# Patient Record
Sex: MALE | Race: WHITE | Employment: FULL TIME | URBAN - METROPOLITAN AREA
[De-identification: names, ages, dates, MRNs, and addresses within clinical notes are randomized per-mention and may not be internally consistent; named-entity substitution may affect disease eponyms.]

---

## 2019-11-10 ENCOUNTER — APPOINTMENT (OUTPATIENT)
Dept: GENERAL RADIOLOGY | Age: 59
End: 2019-11-10
Payer: COMMERCIAL

## 2019-11-10 ENCOUNTER — HOSPITAL ENCOUNTER (EMERGENCY)
Age: 59
Discharge: HOME | End: 2019-11-10
Payer: COMMERCIAL

## 2019-11-10 VITALS
HEART RATE: 92 BPM | OXYGEN SATURATION: 100 % | DIASTOLIC BLOOD PRESSURE: 76 MMHG | RESPIRATION RATE: 16 BRPM | WEIGHT: 210 LBS | BODY MASS INDEX: 30.06 KG/M2 | TEMPERATURE: 99.5 F | HEIGHT: 70 IN | SYSTOLIC BLOOD PRESSURE: 150 MMHG

## 2019-11-10 DIAGNOSIS — S40.012A CONTUSION OF MULTIPLE SITES OF LEFT SHOULDER AND UPPER ARM, INITIAL ENCOUNTER: ICD-10-CM
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
## 2019-11-10 DIAGNOSIS — S40.022A CONTUSION OF MULTIPLE SITES OF LEFT SHOULDER AND UPPER ARM, INITIAL ENCOUNTER: ICD-10-CM

## 2019-11-10 PROCEDURE — 73030 X-RAY EXAM OF SHOULDER: CPT

## 2019-11-10 PROCEDURE — 99284 EMERGENCY DEPT VISIT MOD MDM: CPT

## 2019-11-10 RX ORDER — METHOCARBAMOL 750 MG/1
750 TABLET, FILM COATED ORAL 4 TIMES DAILY
Qty: 40 TABLET | Refills: 0 | Status: SHIPPED | OUTPATIENT
Start: 2019-11-10 | End: 2019-11-20

## 2019-11-10 RX ORDER — NAPROXEN 500 MG/1
500 TABLET ORAL 2 TIMES DAILY
Qty: 60 TABLET | Refills: 0 | Status: SHIPPED | OUTPATIENT
Start: 2019-11-10

## 2019-11-10 ASSESSMENT — PAIN DESCRIPTION - DESCRIPTORS: DESCRIPTORS: CONSTANT;DISCOMFORT;SHARP

## 2019-11-10 ASSESSMENT — PAIN DESCRIPTION - FREQUENCY: FREQUENCY: CONTINUOUS

## 2019-11-10 ASSESSMENT — PAIN DESCRIPTION - ONSET: ONSET: SUDDEN

## 2019-11-10 ASSESSMENT — PAIN DESCRIPTION - LOCATION: LOCATION: SHOULDER

## 2019-11-10 ASSESSMENT — PAIN DESCRIPTION - ORIENTATION: ORIENTATION: LEFT

## 2019-11-10 ASSESSMENT — PAIN DESCRIPTION - PROGRESSION: CLINICAL_PROGRESSION: GRADUALLY WORSENING

## 2019-11-10 ASSESSMENT — PAIN SCALES - GENERAL: PAINLEVEL_OUTOF10: 8

## 2019-11-10 ASSESSMENT — PAIN DESCRIPTION - PAIN TYPE: TYPE: ACUTE PAIN

## 2020-09-17 ENCOUNTER — HOSPITAL ENCOUNTER (OUTPATIENT)
Age: 60
Discharge: HOME | End: 2020-09-19
Payer: COMMERCIAL

## 2020-09-17 LAB — PROSTATE SPECIFIC ANTIGEN: 5.03 NG/ML (ref 0–4)

## 2020-09-17 PROCEDURE — G0103 PSA SCREENING: HCPCS

## 2020-09-21 ENCOUNTER — HOSPITAL ENCOUNTER (OUTPATIENT)
Age: 60
Discharge: HOME | End: 2020-09-23
Payer: COMMERCIAL

## 2020-09-21 PROCEDURE — 88305 TISSUE EXAM BY PATHOLOGIST: CPT

## 2023-07-14 ENCOUNTER — OFFICE VISIT (OUTPATIENT)
Dept: SURGERY | Age: 63
End: 2023-07-14

## 2023-07-14 VITALS
DIASTOLIC BLOOD PRESSURE: 94 MMHG | OXYGEN SATURATION: 98 % | WEIGHT: 209 LBS | HEIGHT: 70 IN | HEART RATE: 84 BPM | TEMPERATURE: 97.4 F | BODY MASS INDEX: 29.92 KG/M2 | SYSTOLIC BLOOD PRESSURE: 148 MMHG

## 2023-07-14 DIAGNOSIS — C44.92 SCC (SQUAMOUS CELL CARCINOMA): ICD-10-CM

## 2023-07-14 DIAGNOSIS — C43.61 MALIGNANT MELANOMA OF RIGHT UPPER EXTREMITY INCLUDING SHOULDER (HCC): ICD-10-CM

## 2023-07-14 DIAGNOSIS — C44.612 BASAL CELL CARCINOMA (BCC) OF SKIN OF RIGHT UPPER EXTREMITY INCLUDING SHOULDER: Primary | ICD-10-CM

## 2023-07-14 PROCEDURE — 99204 OFFICE O/P NEW MOD 45 MIN: CPT | Performed by: PLASTIC SURGERY

## 2023-07-17 ENCOUNTER — TELEPHONE (OUTPATIENT)
Dept: SURGERY | Age: 63
End: 2023-07-17

## 2023-07-17 NOTE — TELEPHONE ENCOUNTER
Patient scheduled with pcp 7/20/2023 am  for medical clearance    Patient will not have insurance until 8/1/2023    Procedure: excision of melanoma left upper arm with 1 cm margin,right forearm basal cell carcinoma,left forearm squamous cell carcinoma with possible full thickness skin graft     Informed wife we can schedule possible 8/1/2023 or 8/3/2023 At [facility]

## 2023-07-21 ENCOUNTER — TELEPHONE (OUTPATIENT)
Dept: SURGERY | Age: 63
End: 2023-07-21

## 2023-07-21 NOTE — TELEPHONE ENCOUNTER
Carlos Thomas wife of patient calling asking if med clearance received, would like surgery date please call [phone]

## 2023-07-24 ENCOUNTER — PREP FOR PROCEDURE (OUTPATIENT)
Dept: SURGERY | Age: 63
End: 2023-07-24

## 2023-07-24 PROBLEM — C44.629 SQUAMOUS CELL CANCER OF SKIN OF LEFT FOREARM: Status: ACTIVE | Noted: 2023-07-24

## 2023-07-24 PROBLEM — C43.62 MALIGNANT MELANOMA OF LEFT UPPER ARM (HCC): Status: ACTIVE | Noted: 2023-07-24

## 2023-07-24 PROBLEM — C44.612 BASAL CELL CARCINOMA OF RIGHT FOREARM: Status: ACTIVE | Noted: 2023-07-24

## 2023-07-24 NOTE — TELEPHONE ENCOUNTER
Awaiting medical clearance from pcp  Patient was seen by pcp    Outpatient procedure: excision of melanoma left upper arm with 1 cm margin,right forearm basal cell carcinoma  left forearm squamous cell carcinoma with possible full thickness skin graft   Surgery has been scheduled at [place], [place] on 8/1/2023, Pre-Admission Testing will call you prior to surgery to inform you arrival time and any other additional directions,if they are unable to reach you,please call them two days prior at [phone]. If taking Fish Oil, Vitamins, two weeks prior to surgery stop taking. If taking NSAIDS (such as Aspirin, Ibuprofen) anticoagulants please consult with your prescribing physician to get further instructions on when to stop medication prior to surgery that is scheduled, patient understood. Pre-Auth #:no prior auth. , required per Rickie rep. ref# [id number]  CPT Codes: 65509,60856,43976,76727,74985,10126  ICD 10:c44.612,c44.92,c43.61

## 2023-07-24 NOTE — TELEPHONE ENCOUNTER
Patient called office provided [employer] id, added to his registration. ID # [id number]. Patient asking if medical clearance has been faxed. Did not see note in epic.

## 2023-07-26 NOTE — H&P
Department of Plastic Surgery - Adult  Attending Consult Note        CHIEF COMPLAINT:   Melanoma, basal cell carcinoma, squamous cell carcinoma     History Obtained From:  patient     HISTORY OF PRESENT ILLNESS:                 The patient is a 60 y.o. male who presents with biopsy proven melanoma of the left upper arm as well as basal cell carcinoma to the right forearm and squamous cell carcinoma to the left forearm. The patient states that they first noticed the melanotic left upper arm lesion several months ago. It has  grown in size since they first noticed the lesion. The lesion has not changed in color and has not had discharge or bleeding. The pt has had the lesion biopsied previously. The patient has not had the lesion removed previously. The patient states the lesion is not painfull. The patient denies any recent unexpected weight loss. Patient denies any palpable masses to their axilla or cervical masses . The pt denies any other associated symptoms. Past Medical History:    Past Medical History        Past Medical History:   Diagnosis Date    Melanoma ([address]) 07/2023     left upper arm         Past Surgical History:    Past Surgical History         Past Surgical History:   Procedure Laterality Date    CYST REMOVAL Right       arm    KNEE SURGERY             Current Medications:   Current Hospital Medications   No current facility-administered medications for this visit. Allergies:  Patient has no known allergies.      Social History:   Social History               Socioeconomic History    Marital status:        Spouse name: Not on file    Number of children: Not on file    Years of education: Not on file    Highest education level: Not on file   Occupational History    Not on file   Tobacco Use    Smoking status: Never    Smokeless tobacco: Current       Types: Chew   Substance and Sexual Activity    Alcohol use: Yes       Comment: holidays    Drug use: Never    Sexual activity: Not

## 2023-07-26 NOTE — PROGRESS NOTES
[address]   PRE-ADMISSION TESTING GENERAL INSTRUCTIONS  [facility] Phone Number: [phone]      GENERAL INSTRUCTIONS:    [x] Antibacterial Soap Shower Night before and/or AM of surgery. [x] Do not wear makeup, lotions, powders, deodorant. [x] Nothing by mouth after midnight; including gum, candy, mints, or water. [x] You may brush your teeth, gargle, but do NOT swallow water. [x] No tobacco products, illegal drugs, or alcohol within 24 hours of your surgery. [x] Jewelry or valuables should not be brought to the hospital. All body and/or tongue piercing's must be removed prior to arriving to hospital. No contact lens or hair pins. [x] Arrange transportation with a responsible adult  to and from the hospital. Arrange for someone to be with you for the remainder of the day and for 24 hours after your procedure due to having had anesthesia. -Who will be your  for transportation? Wife         -Who will be staying with you for 24 hrs after your procedure? Wife  [x] Bring insurance card and photo ID. [x] Bring copy of living will or healthcare power of  papers to be placed in your electronic record. PARKING INSTRUCTIONS:     [x] ARRIVAL DATE & TIME: 8/1/23     0900  [x] Times are subject to change. We will contact you the business day before surgery if that were to occur. [x] Enter into the [employer]. Two people may accompany you. Masks are not required. [x] Parking Lot \"I\" is where you will park. It is located on the corner of [address] [place] [address] and [address] [place] [address]. The entrance is on [address] [facility]. Only one vehicle - per patient, is permitted in parking lot. Upon entering the parking lot, a voucher ticket will print. MEDICATION INSTRUCTIONS:    [x] Bring a complete list of your medications, please write the last time you took the medicine, give this list to the nurse in Pre-Op.   [x] Take only the following medications the

## 2023-08-01 ENCOUNTER — HOSPITAL ENCOUNTER (OUTPATIENT)
Age: 63
Setting detail: OUTPATIENT SURGERY
Discharge: HOME | End: 2023-08-01
Attending: PLASTIC SURGERY | Admitting: PLASTIC SURGERY
Payer: COMMERCIAL

## 2023-08-01 ENCOUNTER — ANESTHESIA (OUTPATIENT)
Dept: OPERATING ROOM | Age: 63
End: 2023-08-01
Payer: COMMERCIAL

## 2023-08-01 ENCOUNTER — ANESTHESIA EVENT (OUTPATIENT)
Dept: OPERATING ROOM | Age: 63
End: 2023-08-01
Payer: COMMERCIAL

## 2023-08-01 VITALS
TEMPERATURE: 97.7 F | HEART RATE: 80 BPM | OXYGEN SATURATION: 99 % | RESPIRATION RATE: 18 BRPM | WEIGHT: 209 LBS | HEIGHT: 70 IN | DIASTOLIC BLOOD PRESSURE: 74 MMHG | BODY MASS INDEX: 29.92 KG/M2 | SYSTOLIC BLOOD PRESSURE: 134 MMHG

## 2023-08-01 DIAGNOSIS — G89.18 POST-OP PAIN: Primary | ICD-10-CM

## 2023-08-01 DIAGNOSIS — C44.629 SQUAMOUS CELL CANCER OF SKIN OF LEFT FOREARM: ICD-10-CM

## 2023-08-01 DIAGNOSIS — C44.612 BASAL CELL CARCINOMA OF RIGHT FOREARM: ICD-10-CM

## 2023-08-01 DIAGNOSIS — C43.62 MALIGNANT MELANOMA OF LEFT UPPER ARM (HCC): ICD-10-CM

## 2023-08-01 PROCEDURE — 7100000010 HC PHASE II RECOVERY - FIRST 15 MIN: Performed by: PLASTIC SURGERY

## 2023-08-01 PROCEDURE — 6370000000 HC RX 637 (ALT 250 FOR IP): Performed by: PLASTIC SURGERY

## 2023-08-01 PROCEDURE — 3600000012 HC SURGERY LEVEL 2 ADDTL 15MIN: Performed by: PLASTIC SURGERY

## 2023-08-01 PROCEDURE — 2500000003 HC RX 250 WO HCPCS

## 2023-08-01 PROCEDURE — 3600000002 HC SURGERY LEVEL 2 BASE: Performed by: PLASTIC SURGERY

## 2023-08-01 PROCEDURE — 2580000003 HC RX 258

## 2023-08-01 PROCEDURE — 88342 IMHCHEM/IMCYTCHM 1ST ANTB: CPT

## 2023-08-01 PROCEDURE — 6360000002 HC RX W HCPCS: Performed by: PHYSICIAN ASSISTANT

## 2023-08-01 PROCEDURE — NBSRV NON-BILLABLE SERVICE: Performed by: PHYSICIAN ASSISTANT

## 2023-08-01 PROCEDURE — 2580000003 HC RX 258: Performed by: PHYSICIAN ASSISTANT

## 2023-08-01 PROCEDURE — 2709999900 HC NON-CHARGEABLE SUPPLY: Performed by: PLASTIC SURGERY

## 2023-08-01 PROCEDURE — 13121 CMPLX RPR S/A/L 2.6-7.5 CM: CPT | Performed by: PLASTIC SURGERY

## 2023-08-01 PROCEDURE — 7100000011 HC PHASE II RECOVERY - ADDTL 15 MIN: Performed by: PLASTIC SURGERY

## 2023-08-01 PROCEDURE — 2500000003 HC RX 250 WO HCPCS: Performed by: PLASTIC SURGERY

## 2023-08-01 PROCEDURE — 88341 IMHCHEM/IMCYTCHM EA ADD ANTB: CPT

## 2023-08-01 PROCEDURE — 6360000002 HC RX W HCPCS

## 2023-08-01 PROCEDURE — 11603 EXC TR-EXT MAL+MARG 2.1-3 CM: CPT | Performed by: PLASTIC SURGERY

## 2023-08-01 PROCEDURE — 11602 EXC TR-EXT MAL+MARG 1.1-2 CM: CPT | Performed by: PLASTIC SURGERY

## 2023-08-01 PROCEDURE — 3700000001 HC ADD 15 MINUTES (ANESTHESIA): Performed by: PLASTIC SURGERY

## 2023-08-01 PROCEDURE — 3700000000 HC ANESTHESIA ATTENDED CARE: Performed by: PLASTIC SURGERY

## 2023-08-01 RX ORDER — LIDOCAINE HYDROCHLORIDE AND EPINEPHRINE 10; 10 MG/ML; UG/ML
INJECTION, SOLUTION INFILTRATION; PERINEURAL PRN
Status: DISCONTINUED | OUTPATIENT
Start: 2023-08-01 | End: 2023-08-01 | Stop reason: ALTCHOICE

## 2023-08-01 RX ORDER — PROPOFOL 10 MG/ML
INJECTION, EMULSION INTRAVENOUS CONTINUOUS PRN
Status: DISCONTINUED | OUTPATIENT
Start: 2023-08-01 | End: 2023-08-01 | Stop reason: SDUPTHER

## 2023-08-01 RX ORDER — CEPHALEXIN 500 MG/1
500 CAPSULE ORAL 4 TIMES DAILY
Qty: 20 CAPSULE | Refills: 0 | Status: SHIPPED | OUTPATIENT
Start: 2023-08-01 | End: 2023-08-06

## 2023-08-01 RX ORDER — FENTANYL CITRATE 50 UG/ML
INJECTION, SOLUTION INTRAMUSCULAR; INTRAVENOUS PRN
Status: DISCONTINUED | OUTPATIENT
Start: 2023-08-01 | End: 2023-08-01 | Stop reason: SDUPTHER

## 2023-08-01 RX ORDER — SODIUM CHLORIDE 0.9 % (FLUSH) 0.9 %
5-40 SYRINGE (ML) INJECTION EVERY 12 HOURS SCHEDULED
Status: DISCONTINUED | OUTPATIENT
Start: 2023-08-01 | End: 2023-08-01 | Stop reason: HOSPADM

## 2023-08-01 RX ORDER — HYDROCODONE BITARTRATE AND ACETAMINOPHEN 5; 325 MG/1; MG/1
1 TABLET ORAL EVERY 6 HOURS PRN
Qty: 15 TABLET | Refills: 0 | Status: SHIPPED | OUTPATIENT
Start: 2023-08-01 | End: 2023-08-08

## 2023-08-01 RX ORDER — GLYCOPYRROLATE 0.2 MG/ML
INJECTION INTRAMUSCULAR; INTRAVENOUS PRN
Status: DISCONTINUED | OUTPATIENT
Start: 2023-08-01 | End: 2023-08-01 | Stop reason: SDUPTHER

## 2023-08-01 RX ORDER — BACITRACIN ZINC 500 [USP'U]/G
OINTMENT TOPICAL PRN
Status: DISCONTINUED | OUTPATIENT
Start: 2023-08-01 | End: 2023-08-01 | Stop reason: ALTCHOICE

## 2023-08-01 RX ORDER — SODIUM CHLORIDE 9 MG/ML
INJECTION, SOLUTION INTRAVENOUS CONTINUOUS
Status: DISCONTINUED | OUTPATIENT
Start: 2023-08-01 | End: 2023-08-01 | Stop reason: HOSPADM

## 2023-08-01 RX ORDER — MIDAZOLAM HYDROCHLORIDE 1 MG/ML
INJECTION INTRAMUSCULAR; INTRAVENOUS PRN
Status: DISCONTINUED | OUTPATIENT
Start: 2023-08-01 | End: 2023-08-01 | Stop reason: SDUPTHER

## 2023-08-01 RX ORDER — SODIUM CHLORIDE 9 MG/ML
INJECTION, SOLUTION INTRAVENOUS PRN
Status: DISCONTINUED | OUTPATIENT
Start: 2023-08-01 | End: 2023-08-01 | Stop reason: HOSPADM

## 2023-08-01 RX ORDER — SODIUM CHLORIDE 9 MG/ML
INJECTION, SOLUTION INTRAVENOUS CONTINUOUS PRN
Status: DISCONTINUED | OUTPATIENT
Start: 2023-08-01 | End: 2023-08-01 | Stop reason: SDUPTHER

## 2023-08-01 RX ORDER — SODIUM CHLORIDE 0.9 % (FLUSH) 0.9 %
5-40 SYRINGE (ML) INJECTION PRN
Status: DISCONTINUED | OUTPATIENT
Start: 2023-08-01 | End: 2023-08-01 | Stop reason: HOSPADM

## 2023-08-01 RX ADMIN — CEFAZOLIN 2000 MG: 2 INJECTION, POWDER, FOR SOLUTION INTRAMUSCULAR; INTRAVENOUS at 10:31

## 2023-08-01 RX ADMIN — PROPOFOL 50 MG: 10 INJECTION, EMULSION INTRAVENOUS at 10:28

## 2023-08-01 RX ADMIN — PROPOFOL 100 MCG/KG/MIN: 10 INJECTION, EMULSION INTRAVENOUS at 10:29

## 2023-08-01 RX ADMIN — FENTANYL CITRATE 50 MCG: 50 INJECTION, SOLUTION INTRAMUSCULAR; INTRAVENOUS at 10:32

## 2023-08-01 RX ADMIN — GLYCOPYRROLATE 0.1 MG: 0.2 INJECTION INTRAMUSCULAR; INTRAVENOUS at 10:41

## 2023-08-01 RX ADMIN — SODIUM CHLORIDE: 9 INJECTION, SOLUTION INTRAVENOUS at 10:21

## 2023-08-01 RX ADMIN — SODIUM CHLORIDE: 9 INJECTION, SOLUTION INTRAVENOUS at 09:23

## 2023-08-01 RX ADMIN — MIDAZOLAM 2 MG: 1 INJECTION INTRAMUSCULAR; INTRAVENOUS at 10:21

## 2023-08-01 ASSESSMENT — PAIN SCALES - GENERAL: PAINLEVEL_OUTOF10: 0

## 2023-08-01 ASSESSMENT — PAIN - FUNCTIONAL ASSESSMENT: PAIN_FUNCTIONAL_ASSESSMENT: 0-10

## 2023-08-01 NOTE — DISCHARGE INSTRUCTIONS
Discharge Home. Call office to schedule a follow-up appointment at [phone]  Please call to schedule an appointment to be seen In our office on Friday 8- 11-23  Diet: regular diet  Activity: ambulate in house and no Strenuous exercise for 1 week  Shower/Bathing: You can shower in 48 hours over the incision site. At that time you can allow soap and water to rinse off the incision site while showering. Once you are done in the shower you can pat dry the incision site with a clean paper towel. No baths, hot tubs or soaking of the wound site at this time. Dressings /Splint /Wound Care:Keep wound clean and dry. Apply bacitracin  to the wound sites two times daily. Driving: It is OK to drive if the following criteria are met:   1- Not taking narcotic pain medication   2- Not distracted by pain or limited ROM   3- Not a hazard to self or others on the road  Medications: As prescribed. Educated to contact physician at [phone] with concerns or signs of infection (redness, increasing pain, discharge, odor, fever).

## 2023-08-01 NOTE — OP NOTE
Operative Note      Patient: Fareed Arteaga  YOB: 1960  MRN: [id number]    Date of Procedure: 8/1/2023    Pre-Op Diagnosis Codes:     * Malignant melanoma of left upper arm ([address]) [C43.62]     * Basal cell carcinoma of right forearm [C44.612]     * Squamous cell cancer of skin of left forearm [C44.629]    Post-Op Diagnosis: Same       Procedure(s):  Excision of melanoma left upper arm with 1cm margin,right forearm basal cell carcinoma,left forearm squamous cell carcinoma with Complex Closures    Surgeon(s):  Fahad Borges MD    Assistant:   Physician Assistant: RUI Morales    Anesthesia: Monitor Anesthesia Care    Estimated Blood Loss (mL): 5cc    Complications: None    Specimens:   ID Type Source Tests Collected by Time Destination   A : Malignant melanoma of left upper arm  Tissue Tissue SURGICAL PATHOLOGY Fahad Borges MD 8/1/2023 1044    B : Basal cell carcinoma of right forearm  Tissue Tissue SURGICAL PATHOLOGY Fahad Borges MD 8/1/2023 1045    C : Squamous cell carcinoma of left forearm Tissue Tissue SURGICAL PATHOLOGY Fahad Borges MD 8/1/2023 1045        Implants:  * No implants in log *      Drains: * No LDAs found *        Wide Local Excision for Primary Cutaneous Melanoma - Excision 1 (Upper Arm - Left)  Operation performed with curative intent Yes   Original Breslow thickness of the lesion  0.7mm Breslow   Clinical margin width  (measured from the edge of the lesion or the prior excision scar) 1 cm   Depth of excision Full-thickness skin/subcutaneous tissue down to fascia (melanoma)         Detailed Description of Procedure:        ASSISTANT: Brigitte Wilhelm PA-C. Physician assistant was required for the case due to lack adequately trained assistant was involved in prepping draping retracting dressing .     MEASUREMENTS:  Left upper arm malignant melanoma  Lesion: 10 x 10 mm  Margin: 10 mm  Defect: 30 x 30 mm  Scar: 80 mm    Left forearm squamous cell carcinoma  Lesion: 5 x 5 mm  Margin: 4 mm  Defect: 13 x 13 mm  Scar: 45 mm    Right arm basal cell cancer  Lesion: 16 x 16 mm  Margin: 2 mm  Defect: 20 x 20 mm  Scar: 60 mm      PREOPERATIVE INDICATIONS:      The patient is an 60 y.o. male with history of biopsy-proven left upper arm malignant melanoma at Breslow depth of 0.7 mm, left forearm squamous cell carcinoma and right arm basal cell carcinoma. The patient elected to have this excised in the operating room secondary to size, location, frozen sections, and need for potential advanced reconstruction technique. Risks, benefits, and alternatives were explained to the patient including but not limited to bleeding, scarring, infection, recurrent lesion, anesthesia related complications, and need for additional surgery. They understood and elected to proceed. OPERATIVE PROCEDURE:      The patient was seen the day of surgery, marked, and all questions were answered. The patient was taken back to the operating room, placed in supine position. SCDs were on and functioning at the time of induction of anesthesia. Preoperative antibiotics were given prior to incision. The area was prepped and draped in the usual sterile fashion with chlorhexidine prep stick. The area was marked and measured and 2 mm for basal cell, 4 mm for squamous cell, and 10 mm for-melanoma margins were taken circumferentially around the lesion. The area was then anesthetized with 1% lidocaine with 100,000 epinephrine and allowed to work. A 15 blade was used to incise full thickness through the skin and the lesion was sharply lifted off the underlying subcutaneous tissue. The melanoma specimen was marked and sent for permanent pathology. The other specimens were sent for permanent pathology as well. Hemostasis was achieved with monopolar cautery. The area was assessed for the best manner of reconstruction. All areas could be closed by complex means.   With the aid of double hooks

## 2023-08-01 NOTE — ANESTHESIA POSTPROCEDURE EVALUATION
Department of Anesthesiology  Postprocedure Note    Patient: Giana New  MRN: [id number]  YOB: 1960  Date of evaluation: 8/1/2023      Procedure Summary     Date: 08/01/23 Room / Location: [facility] OR 10 / [facility]    Anesthesia Start: 1021 Anesthesia Stop: 1112    Procedure: Excision of melanoma left upper arm with 1cm margin,right forearm basal cell carcinoma,left forearm squamous cell carcinoma (Bilateral: Arm Lower) Diagnosis:       Malignant melanoma of left upper arm (HCC)      Basal cell carcinoma of right forearm      Squamous cell cancer of skin of left forearm      (Malignant melanoma of left upper arm (HCC) [C43.62])      (Basal cell carcinoma of right forearm [C44.612])      (Squamous cell cancer of skin of left forearm [C44.629])    Surgeons: Yash Barbour MD Responsible Provider: Huseyin Villalta MD    Anesthesia Type: MAC ASA Status: 3          Anesthesia Type: No value filed.     Antionette Phase I: Antionette Score: 10    Antionette Phase II: Antionette Score: 10      Anesthesia Post Evaluation    Patient location during evaluation: PACU  Patient participation: complete - patient participated  Level of consciousness: awake  Pain score: 3  Airway patency: patent  Nausea & Vomiting: no nausea and no vomiting  Complications: no  Cardiovascular status: blood pressure returned to baseline  Respiratory status: acceptable  Hydration status: euvolemic

## 2023-08-01 NOTE — ANESTHESIA PRE PROCEDURE
for: PREGTESTUR, PREGSERUM, HCG, HCGQUANT     ABGs: No results found for: PHART, PO2ART, STM7WQC, ZIS0ULU, BEART, J2SPREBX     Type & Screen (If Applicable):  No results found for: LABABO, LABRH    Drug/Infectious Status (If Applicable):  No results found for: HIV, HEPCAB    COVID-19 Screening (If Applicable): No results found for: COVID19        Anesthesia Evaluation  Patient summary reviewed and Nursing notes reviewed no history of anesthetic complications:   Airway: Mallampati: II          Dental:    (+) poor dentition      Pulmonary:Negative Pulmonary ROS breath sounds clear to auscultation                             Cardiovascular:Negative CV ROS  Exercise tolerance: good (>4 METS),           Rhythm: regular  Rate: normal                    Neuro/Psych:   Negative Neuro/Psych ROS              GI/Hepatic/Renal: Neg GI/Hepatic/Renal ROS            Endo/Other: Negative Endo/Other ROS   (+) malignancy/cancer (skin). Abdominal:             Vascular: negative vascular ROS. Other Findings:           Anesthesia Plan      MAC     ASA 3       Induction: intravenous. Anesthetic plan and risks discussed with patient. Plan discussed with surgical team, attending and fellow.                     Nicolas Gerardo, APRN - CRNA   8/1/2023

## 2023-08-07 LAB — SURGICAL PATHOLOGY REPORT: NORMAL

## 2023-08-10 NOTE — PROGRESS NOTES
Subjective: Follow up today from  Excision of melanoma left upper arm with 1cm margin,right forearm basal cell carcinoma,left forearm squamous cell carcinoma with Complex Closures. Denies fever, nausea, vomiting, leg pain or swelling, pain is absent. The pt states that they have  kept the wound site(s) covered as directed. They state that their pain is absent. Objective: There were no vitals taken for this visit. Wounds x 3 : Clean, dry, and intact, no drainage. No signs of infection, wound healing well. Sutures intact  Neuro- Sensation  intact to belgica incisional site with light touch . Assessment:    Patient Active Problem List   Diagnosis    Malignant melanoma of left upper arm ([address])    Basal cell carcinoma of right forearm    Squamous cell cancer of skin of left forearm    Post-op pain       Labs: CBC:   Lab Results   Component Value Date/Time    WBC 6.1 10/11/2016 09:00 AM    RBC 5.44 10/11/2016 09:00 AM    HGB 15.5 10/11/2016 09:00 AM    HCT 48.4 10/11/2016 09:00 AM    MCV 88.9 10/11/2016 09:00 AM    MCH 28.5 10/11/2016 09:00 AM    MCHC 32.1 10/11/2016 09:00 AM    RDW 14.2 10/11/2016 09:00 AM     10/11/2016 09:00 AM    MPV 9.7 10/11/2016 09:00 AM     BMP:    Lab Results   Component Value Date/Time     10/11/2016 09:00 AM    K 4.9 10/11/2016 09:00 AM     10/11/2016 09:00 AM    CO2 28 10/11/2016 09:00 AM    BUN 18 10/11/2016 09:00 AM    LABALBU 4.2 10/11/2016 09:00 AM    CREATININE 1.0 10/11/2016 09:00 AM    CALCIUM 9.2 10/11/2016 09:00 AM    GFRAA >60 10/11/2016 09:00 AM    LABGLOM >60 10/11/2016 09:00 AM    GLUCOSE 98 10/11/2016 09:00 AM         Pathology Report- Path Number: [id number]     -- Diagnosis --   A.   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

## 2023-08-11 ENCOUNTER — OFFICE VISIT (OUTPATIENT)
Dept: SURGERY | Age: 63
End: 2023-08-11

## 2023-08-11 VITALS — TEMPERATURE: 99.3 F

## 2023-08-11 DIAGNOSIS — C44.92 SCC (SQUAMOUS CELL CARCINOMA): Primary | ICD-10-CM

## 2023-08-11 DIAGNOSIS — C43.61 MALIGNANT MELANOMA OF RIGHT UPPER EXTREMITY INCLUDING SHOULDER (HCC): ICD-10-CM

## 2023-08-28 ENCOUNTER — OFFICE VISIT (OUTPATIENT)
Dept: SURGERY | Age: 63
End: 2023-08-28

## 2023-08-28 VITALS — OXYGEN SATURATION: 99 % | TEMPERATURE: 98.3 F | HEART RATE: 82 BPM

## 2023-08-28 DIAGNOSIS — C43.61 MALIGNANT MELANOMA OF RIGHT UPPER EXTREMITY INCLUDING SHOULDER (HCC): Primary | ICD-10-CM

## 2023-08-28 PROCEDURE — 99024 POSTOP FOLLOW-UP VISIT: CPT | Performed by: PHYSICIAN ASSISTANT

## 2023-08-28 NOTE — PROGRESS NOTES
he can begin scar massage to the area in another 2 weeks. Pt was instructed on scar massage  Scar Care Instructions      Sunscreen Recommendations for Scars  We recommend that all patients use a sunscreen when outside but especially on new scars (that are exposed to sunlight) for a year after their procedure. The SPF should be at least 28. Follow the application directions on the bottle. Why is it so Important to Use Sunscreen on Scars? A scar is new and more fragile than the surrounding skin. If you do not use sunscreen, the scar line will react differently to the sun than the surrounding skin. If you don't use sunscreen, the scar tissue will become darker than surrounding skin. This is a hyper-pigmented scar and will remain darker than the other skin. After one year, the scar and surrounding skin should react equally to sun. Superficial Scar Massage  Scar massage desensitizes and reduces scar adhesions so skin glides freely. Rub in a circular motion on and around the scar with firm, even pressure for 5 minutes four times per day   You can start scar massage once incision is completely healed and strong enough to handle the motion (usually 10 - 14 days post operatively). You use lotion to do the scar massage to allow ease with motion over the scar and prevent friction at the area. Patient had no further questions. F/U PRN  Call office with concerns or signs of infection.     Priscilla Eddy [place]   9:43 AM  8/28/2023

## 2023-09-11 ENCOUNTER — OFFICE VISIT (OUTPATIENT)
Dept: SURGERY | Age: 63
End: 2023-09-11

## 2023-09-11 VITALS — TEMPERATURE: 98.3 F

## 2023-09-11 DIAGNOSIS — C44.612 BASAL CELL CARCINOMA OF RIGHT FOREARM: Primary | ICD-10-CM

## 2023-09-11 DIAGNOSIS — C43.61 MALIGNANT MELANOMA OF RIGHT UPPER EXTREMITY INCLUDING SHOULDER (HCC): ICD-10-CM

## 2023-09-11 DIAGNOSIS — C44.629 SQUAMOUS CELL CANCER OF SKIN OF LEFT FOREARM: ICD-10-CM

## 2023-09-11 PROCEDURE — 99024 POSTOP FOLLOW-UP VISIT: CPT | Performed by: PHYSICIAN ASSISTANT

## 2023-09-11 NOTE — PROGRESS NOTES
the other skin. After one year, the scar and surrounding skin should react equally to sun. Superficial Scar Massage  Scar massage desensitizes and reduces scar adhesions so skin glides freely. Rub in a circular motion on and around the scar with firm, even pressure for 5 minutes four times per day   You can start scar massage once incision is completely healed and strong enough to handle the motion (usually 10 - 14 days post operatively). You use lotion to do the scar massage to allow ease with motion over the scar and prevent friction at the area. Patient had no further questions. The patient was given a letter today to return to work with no restrictions. F/U PRN  Call office with concerns or signs of infection.     Lucía Aponte   11:08 AM  9/11/2023

## (undated) DEVICE — UNIVERSAL DRAPE: Brand: MEDLINE INDUSTRIES, INC.

## (undated) DEVICE — GLOVE ORANGE PI 8   MSG9080

## (undated) DEVICE — DRAPE,HAND,STERILE: Brand: MEDLINE

## (undated) DEVICE — BASIC: Brand: MEDLINE INDUSTRIES, INC.

## (undated) DEVICE — TOWEL,OR,DSP,ST,BLUE,STD,6/PK,12PK/CS: Brand: MEDLINE

## (undated) DEVICE — PAD,ABDOMINAL,5"X9",ST,LF,25/BX: Brand: MEDLINE INDUSTRIES, INC.

## (undated) DEVICE — BLADE,STAINLESS-STEEL,15,STRL,DISPOSABLE: Brand: MEDLINE

## (undated) DEVICE — NEEDLE HYPO 27GA L1.25IN GRY POLYPR HUB S STL REG BVL STR

## (undated) DEVICE — STERILE POLYISOPRENE POWDER-FREE SURGICAL GLOVES: Brand: PROTEXIS

## (undated) DEVICE — TUBING SUCT 12FR MAL ALUM SHFT FN CAP VENT UNIV CONN W/ OBT

## (undated) DEVICE — BLADE,STAINLESS-STEEL,10,STRL,DISPOSABLE: Brand: MEDLINE

## (undated) DEVICE — PAD N ADH W3XL4IN POLY COT SFT PERF FLM EASILY CUT ABSRB

## (undated) DEVICE — Device

## (undated) DEVICE — SYRINGE MED 10ML TRNSLUC BRL PLUNG BLK MRK POLYPR CTRL